# Patient Record
Sex: FEMALE | Race: BLACK OR AFRICAN AMERICAN | HISPANIC OR LATINO | URBAN - METROPOLITAN AREA
[De-identification: names, ages, dates, MRNs, and addresses within clinical notes are randomized per-mention and may not be internally consistent; named-entity substitution may affect disease eponyms.]

---

## 2018-07-17 ENCOUNTER — EMERGENCY (EMERGENCY)
Facility: HOSPITAL | Age: 78
LOS: 1 days | Discharge: HOME | End: 2018-07-17
Attending: EMERGENCY MEDICINE | Admitting: EMERGENCY MEDICINE
Payer: MEDICARE

## 2018-07-17 VITALS
OXYGEN SATURATION: 99 % | TEMPERATURE: 99 F | DIASTOLIC BLOOD PRESSURE: 95 MMHG | RESPIRATION RATE: 16 BRPM | SYSTOLIC BLOOD PRESSURE: 152 MMHG | HEART RATE: 84 BPM

## 2018-07-17 VITALS
DIASTOLIC BLOOD PRESSURE: 94 MMHG | OXYGEN SATURATION: 100 % | TEMPERATURE: 99 F | RESPIRATION RATE: 17 BRPM | HEART RATE: 99 BPM | SYSTOLIC BLOOD PRESSURE: 162 MMHG

## 2018-07-17 LAB
ALBUMIN SERPL ELPH-MCNC: 3.7 G/DL — SIGNIFICANT CHANGE UP (ref 3.3–5)
ALP SERPL-CCNC: 65 U/L — SIGNIFICANT CHANGE UP (ref 40–120)
ALT FLD-CCNC: 14 U/L — SIGNIFICANT CHANGE UP (ref 4–33)
AST SERPL-CCNC: 27 U/L — SIGNIFICANT CHANGE UP (ref 4–32)
BASOPHILS # BLD AUTO: 0.01 K/UL — SIGNIFICANT CHANGE UP (ref 0–0.2)
BASOPHILS NFR BLD AUTO: 0.2 % — SIGNIFICANT CHANGE UP (ref 0–2)
BILIRUB SERPL-MCNC: 0.4 MG/DL — SIGNIFICANT CHANGE UP (ref 0.2–1.2)
BUN SERPL-MCNC: 14 MG/DL — SIGNIFICANT CHANGE UP (ref 7–23)
CALCIUM SERPL-MCNC: 8.9 MG/DL — SIGNIFICANT CHANGE UP (ref 8.4–10.5)
CHLORIDE SERPL-SCNC: 102 MMOL/L — SIGNIFICANT CHANGE UP (ref 98–107)
CO2 SERPL-SCNC: 27 MMOL/L — SIGNIFICANT CHANGE UP (ref 22–31)
CREAT SERPL-MCNC: 0.85 MG/DL — SIGNIFICANT CHANGE UP (ref 0.5–1.3)
EOSINOPHIL # BLD AUTO: 0.13 K/UL — SIGNIFICANT CHANGE UP (ref 0–0.5)
EOSINOPHIL NFR BLD AUTO: 2 % — SIGNIFICANT CHANGE UP (ref 0–6)
GLUCOSE SERPL-MCNC: 78 MG/DL — SIGNIFICANT CHANGE UP (ref 70–99)
HCT VFR BLD CALC: 41.1 % — SIGNIFICANT CHANGE UP (ref 34.5–45)
HGB BLD-MCNC: 13.8 G/DL — SIGNIFICANT CHANGE UP (ref 11.5–15.5)
IMM GRANULOCYTES # BLD AUTO: 0.01 # — SIGNIFICANT CHANGE UP
IMM GRANULOCYTES NFR BLD AUTO: 0.2 % — SIGNIFICANT CHANGE UP (ref 0–1.5)
LYMPHOCYTES # BLD AUTO: 1.59 K/UL — SIGNIFICANT CHANGE UP (ref 1–3.3)
LYMPHOCYTES # BLD AUTO: 24.1 % — SIGNIFICANT CHANGE UP (ref 13–44)
MCHC RBC-ENTMCNC: 32.4 PG — SIGNIFICANT CHANGE UP (ref 27–34)
MCHC RBC-ENTMCNC: 33.6 % — SIGNIFICANT CHANGE UP (ref 32–36)
MCV RBC AUTO: 96.5 FL — SIGNIFICANT CHANGE UP (ref 80–100)
MONOCYTES # BLD AUTO: 0.46 K/UL — SIGNIFICANT CHANGE UP (ref 0–0.9)
MONOCYTES NFR BLD AUTO: 7 % — SIGNIFICANT CHANGE UP (ref 2–14)
NEUTROPHILS # BLD AUTO: 4.41 K/UL — SIGNIFICANT CHANGE UP (ref 1.8–7.4)
NEUTROPHILS NFR BLD AUTO: 66.5 % — SIGNIFICANT CHANGE UP (ref 43–77)
NRBC # FLD: 0 — SIGNIFICANT CHANGE UP
PLATELET # BLD AUTO: 193 K/UL — SIGNIFICANT CHANGE UP (ref 150–400)
PMV BLD: 10.4 FL — SIGNIFICANT CHANGE UP (ref 7–13)
POTASSIUM SERPL-MCNC: 4.7 MMOL/L — SIGNIFICANT CHANGE UP (ref 3.5–5.3)
POTASSIUM SERPL-SCNC: 4.7 MMOL/L — SIGNIFICANT CHANGE UP (ref 3.5–5.3)
PROT SERPL-MCNC: 6.9 G/DL — SIGNIFICANT CHANGE UP (ref 6–8.3)
RBC # BLD: 4.26 M/UL — SIGNIFICANT CHANGE UP (ref 3.8–5.2)
RBC # FLD: 13.2 % — SIGNIFICANT CHANGE UP (ref 10.3–14.5)
SODIUM SERPL-SCNC: 140 MMOL/L — SIGNIFICANT CHANGE UP (ref 135–145)
TROPONIN T, HIGH SENSITIVITY: 7 NG/L — SIGNIFICANT CHANGE UP (ref ?–14)
TROPONIN T, HIGH SENSITIVITY: 7 NG/L — SIGNIFICANT CHANGE UP (ref ?–14)
WBC # BLD: 6.61 K/UL — SIGNIFICANT CHANGE UP (ref 3.8–10.5)
WBC # FLD AUTO: 6.61 K/UL — SIGNIFICANT CHANGE UP (ref 3.8–10.5)

## 2018-07-17 PROCEDURE — 71046 X-RAY EXAM CHEST 2 VIEWS: CPT | Mod: 26

## 2018-07-17 PROCEDURE — 99284 EMERGENCY DEPT VISIT MOD MDM: CPT

## 2018-07-17 RX ORDER — DIPHENHYDRAMINE HCL 50 MG
25 CAPSULE ORAL ONCE
Qty: 0 | Refills: 0 | Status: COMPLETED | OUTPATIENT
Start: 2018-07-17 | End: 2018-07-17

## 2018-07-17 RX ADMIN — Medication 25 MILLIGRAM(S): at 14:19

## 2018-07-17 NOTE — ED PROVIDER NOTE - OBJECTIVE STATEMENT
78y F with PMHx TIA, borderline HTN, and borderline HLD presents to the ED for rash in right upper arm x4 days. States rash has been spreading and face was swelling. Took benadryl with mild relief. Rashes is waxing and waning. Says last month had similar s/x that resolved. No smoke, drink, or drug use. Also states she had CP for couple hours when waking up this morning that self resolved. No vomiting, diarrhea, SOB, difficulty breathing, or sweating. CP rated 5/10 in severity. No hx of heart attacks. Took benadryl at 9:30AM this morning. No new pets, animals, or detergents

## 2018-07-17 NOTE — ED PROVIDER NOTE - MEDICAL DECISION MAKING DETAILS
78y F with hx of TIA, borderline HTN, and borderline HLD coming in for pruritic rash waxing and waning improving today with benadryl. Mild blanching. Rash on right upper arm. Uvula and pharynx within nml limits. Lungs clear to ausculation. Will give benadryl. No new exposures. Also c/o substernal nonradiating CP rated 5/10 in severity this morning while in bed. Lasted for several hours and self resolved. No hx of CP. EKG, labs with troponin, CXR, and aspirin. If troponin negative pt to follow up with PMD as outpt

## 2018-07-17 NOTE — ED ADULT TRIAGE NOTE - CHIEF COMPLAINT QUOTE
Pt complaining of feeling itchy all over her body since Saturday. Pt states she has an intermittent rash all over. Pt states she has been taking Benadryl but has not been feeling better. Pt denies SOB.

## 2018-07-17 NOTE — ED PROVIDER NOTE - NS_ ATTENDINGSCRIBEDETAILS _ED_A_ED_FT
I performed the initial face to face bedside interview with this patient regarding history of present illness, review of symptoms and past medical, social and family history.  I completed an independent physical examination.  I was the initial provider who evaluated this patient.  The history, review of symptoms and examination was documented by the scribe in my presence and I attest to the accuracy of the documentation.  I have signed out the follow up of any pending tests (i.e. labs, radiological studies) to the PA/resident.  I have discussed the patient’s plan of care and disposition with the PA/resident.

## 2018-07-17 NOTE — ED PROVIDER NOTE - PROGRESS NOTE DETAILS
Darwin Haider, PGY3: Pt with no chest pain. Feels well. Repeat trop unchanged. Patient informed of ED visit findings, understands plan.  Patient provided with written and further verbal instructions not included in discharge paperwork.  Patient instructed to follow up with their primary care physician in 2-3 days and return for new, worsened, or persistent symptoms.

## 2023-03-05 NOTE — ED PROVIDER NOTE - SKIN [+], MLM
Hospitalist Progress Note    Chief complaint:  Chief Complaint   Patient presents with    Fall     Pt had a fall pain to right leg. EMS gave Dilaudid and zofran pta        Admit date:  3/1/2023    Days in hospital:    4    Synopsis :  77years old female, presented after a fall inability to ambulate, imaging revealed left intertrochanteric fracture, patient status post ORIF. Patient admitted to hospital medicine service    Assessment and plan:  Principal Problem:    Intertrochanteric fracture of right femur, closed, initial encounter [facility])  Active Problems:    History of femur fracture  Resolved Problems:    * No resolved hospital problems. *       Assessment   left intertrochanteric femoral fracture status post right hip cephalomedullary nail on March 2, Currently on DVT prophylaxis with aspirin 325mg PO BID  Left hip pain  Chronic respiratory failure requiring 4 L nasal cannula  History of COPD, Former smoker   Diarrhea, now resolved. Plan   Continue DVT prophylaxis with aspirin BID per ortho recs   Continue PT OT evaluation in the hospital, will likely need placement to skilled nursing facility  Stool sample not sent for C. difficile rule out, she is not having any more diarrhea. Will hold off on any antidiarrheal treatment at this point  Continue pain management supportive care    DVT prophylaxis: Aspirin  CODE STATUS: Patient is a full code  Discharge plan: Stable to be discharged from clinical standpoint, pending placement to skilled nursing facility    Subjective:   No acute events overnight, this morning patient seen at bedside no acute distress. Patient is not reporting any chest pain. No obvious difficulty in breathing at this point and no worsening of any dyspnea reported. No abdominal pain or discomfort. No dizziness. No new complaint. All questions answered in detail at bedside.   Plan discussed with nursing staff in detail. Objective:    Physical examination:  VS: /76   Pulse 68   Temp 97.5 °F (36.4 °C) (Temporal)   Resp 17   Ht 5' 7\" (1.702 m)   Wt 210 lb 12.8 oz (95.6 kg)   SpO2 98%   BMI 33.02 kg/m²   I/O:   Intake/Output Summary (Last 24 hours) at 3/5/2023 1103  Last data filed at 3/5/2023 0604  Gross per 24 hour   Intake --   Output 1250 ml   Net -1250 ml     General Appearance: alert and oriented to person, place and time, in no acute distress  HEENT: normocephalic and atraumatic, pupils equal, round, and reactive to light, Ssupple and non-tender without mass, no thyromegaly   Cardiovascular: normal rate, regular rhythm, normal S1 and S2, no murmurs, rubs, clicks, or gallops, distal pulses intact, no carotid bruits, no JVD  Pulmonary/Chest: clear to auscultation bilaterally  Abdomen: soft, non-tender, non-distended, normal bowel sounds, no masses   Extremities: Surgical area examined, incisional area covered with surgical dressing, no bloody drainage no discharge noticed, surrounding area having no significant swelling erythema or any signs of infection. Overall looking well than my previous evaluation.  No cyanosis, clubbing or edema, pulse   Musculoskeletal: normal range of motion, no joint swelling, deformity or tenderness  Neurological: alert, oriented, normal speech, no focal findings or movement disorder noted  Skin: warm and dry, no rash or erythema    Medications:  Scheduled Meds:   enoxaparin  40 mg SubCUTAneous Daily    sodium chloride flush  5-40 mL IntraVENous 2 times per day    aspirin  325 mg Oral BID    sodium chloride flush  10 mL IntraVENous 2 times per day    acetaminophen  650 mg Oral 4 times per day    budesonide  250 mcg Nebulization BID    arformoterol tartrate  15 mcg Nebulization BID       PRN Meds:  sodium chloride flush, sodium chloride, albuterol, sodium chloride flush, sodium chloride, ondansetron **OR** ondansetron, magnesium hydroxide, acetaminophen **OR** acetaminophen, morphine, oxyCODONE **OR** oxyCODONE    IV:   sodium chloride      sodium chloride         LABS:  Recent Results (from the past 24 hour(s))   Basic Metabolic Panel w/ Reflex to MG    Collection Time: 03/05/23  4:26 AM   Result Value Ref Range    Sodium 138 132 - 146 mmol/L    Potassium reflex Magnesium 4.4 3.5 - 5.0 mmol/L    Chloride 106 98 - 107 mmol/L    CO2 24 22 - 29 mmol/L    Anion Gap 8 7 - 16 mmol/L    Glucose 124 (H) 74 - 99 mg/dL    BUN 13 6 - 23 mg/dL    Creatinine 0.5 0.5 - 1.0 mg/dL    Est, Glom Filt Rate >60 >=60 mL/min/1.73    Calcium 8.1 (L) 8.6 - 10.2 mg/dL   CBC    Collection Time: 03/05/23  4:26 AM   Result Value Ref Range    WBC 13.1 (H) 4.5 - 11.5 E9/L    RBC 2.92 (L) 3.50 - 5.50 E12/L    Hemoglobin 8.3 (L) 11.5 - 15.5 g/dL    Hematocrit 26.5 (L) 34.0 - 48.0 %    MCV 90.8 80.0 - 99.9 fL    MCH 28.4 26.0 - 35.0 pg    MCHC 31.3 (L) 32.0 - 34.5 %    RDW 14.3 11.5 - 15.0 fL    Platelets 445 894 - 013 E9/L    MPV 10.2 7.0 - 12.0 fL       RADIOLOGY:  XR WRIST RIGHT (MIN 3 VIEWS)    Result Date: 3/1/2023  EXAMINATION: XRAY VIEWS OF THE RIGHT WRIST 3/1/2023 11:22 am COMPARISON: None. HISTORY: ORDERING SYSTEM PROVIDED HISTORY: fall TECHNOLOGIST PROVIDED HISTORY: Reason for exam:->fall What reading provider will be dictating this exam?->CRC FINDINGS: Carpal bones and alignment are maintained. Distal radius and ulna are intact. No acute fracture or dislocation. Normal wrist radiographs     XR HAND RIGHT (MIN 3 VIEWS)    Result Date: 3/1/2023  EXAMINATION: THREE XRAY VIEWS OF THE RIGHT HAND 3/1/2023 11:22 am COMPARISON: None. HISTORY: ORDERING SYSTEM PROVIDED HISTORY: fall TECHNOLOGIST PROVIDED HISTORY: Reason for exam:->fall What reading provider will be dictating this exam?->CRC FINDINGS: There is no evidence of acute fracture. There is normal alignment. No acute joint abnormality. No focal osseous lesion. No focal soft tissue abnormality. No acute osseous abnormality.      XR FEMUR RIGHT (MIN 2 VIEWS)    Result Date: 3/2/2023  EXAMINATION: XRAY VIEWS OF THE RIGHT FEMUR; ONE XRAY VIEW OF THE PELVIS AND TWO XRAY VIEWS RIGHT HIP; SPOT FLUOROSCOPIC IMAGES 3/2/2023 4:03 pm COMPARISON: None. HISTORY: ORDERING SYSTEM PROVIDED HISTORY: Post op in PACU TECHNOLOGIST PROVIDED HISTORY: Reason for exam:->Post op in PACU What reading provider will be dictating this exam?->CRC FINDINGS: Pelvis and right hip. Patient is status post intertrochanteric fracture of the right femur with internal fixation with normal alignment. Degenerative  changes are identified in the lumbosacral junction and left hip. Dilated bowel loops are noted. . Right femur. Intertrochanteric fracture of the right femur with internal fixation with screws and intramedullary tristian are identified with normal alignment. Postoperative changes are identified in the right groin and right hip. There is advanced degenerative changes in the right knee. intertrochanteric fracture of the right femur with internal fixation with the normal alignment. There is associated postoperative changes. Degenerative changes in the right knee. Fluoroscopy for surgery. Fluoroscopy is was provided for internal fixation of the right hip fracture. Total fluoroscopy time is 92.5 seconds and accumulated dose is 23.35 mGy. 8 fluoroscopic images are provided. The images demonstrate internal fixation of the right femoral fracture. Impression Fluoroscopy assistance are provided for internal fixation of the right femoral fracture. XR FEMUR RIGHT (MIN 2 VIEWS)    Result Date: 3/1/2023  EXAMINATION: XRAY VIEWS OF THE RIGHT FEMUR 3/1/2023 11:22 am COMPARISON: None. HISTORY: ORDERING SYSTEM PROVIDED HISTORY: fall TECHNOLOGIST PROVIDED HISTORY: Reason for exam:->fall What reading provider will be dictating this exam?->CRC FINDINGS: There is comminuted intertrochanteric fracture and varus angulation. Degenerative changes in the visualized knee.   Soft tissue swelling adjacent to the fracture site noted. Comminuted intertrochanteric fracture. CT HEAD WO CONTRAST    Result Date: 3/1/2023  EXAMINATION: CT OF THE HEAD WITHOUT CONTRAST  3/1/2023 11:32 am TECHNIQUE: CT of the head was performed without the administration of intravenous contrast. Automated exposure control, iterative reconstruction, and/or weight based adjustment of the mA/kV was utilized to reduce the radiation dose to as low as reasonably achievable. COMPARISON: None. HISTORY: ORDERING SYSTEM PROVIDED HISTORY: fall TECHNOLOGIST PROVIDED HISTORY: Reason for exam:->fall Has a \"code stroke\" or \"stroke alert\" been called? ->No Decision Support Exception - unselect if not a suspected or confirmed emergency medical condition->Emergency Medical Condition (MA) What reading provider will be dictating this exam?->CRC FINDINGS: BRAIN/VENTRICLES: There is no acute intracranial hemorrhage, mass effect or midline shift. No abnormal extra-axial fluid collection. The gray-white differentiation is maintained without evidence of an acute infarct. There is no evidence of hydrocephalus. The ventricles, cisterns and sulci are prominent consistent with atrophy. There is decreased attenuation within the periventricular white matter consistent with periventricular leukomalacia. ORBITS: The visualized portion of the orbits demonstrate no acute abnormality. SINUSES: The visualized paranasal sinuses and mastoid air cells demonstrate no acute abnormality. SOFT TISSUES/SKULL:  No acute abnormality of the visualized skull or soft tissues. 1.  There is no acute intracranial abnormality. Specifically, there is no intracranial hemorrhage. 2. Atrophy and periventricular leukomalacia, 3. 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  .     CT CERVICAL SPINE WO CONTRAST    Result Date: 3/1/2023  EXAMINATION: CT OF THE CERVICAL SPINE WITHOUT CONTRAST 3/1/2023 11:32 am TECHNIQUE: CT of the cervical spine was performed without the administration of intravenous contrast. Multiplanar reformatted images are provided for review. Automated exposure control, iterative reconstruction, and/or weight based adjustment of the mA/kV was utilized to reduce the radiation dose to as low as reasonably achievable. COMPARISON: None. HISTORY: ORDERING SYSTEM PROVIDED HISTORY: fall TECHNOLOGIST PROVIDED HISTORY: Reason for exam:->fall Decision Support Exception - unselect if not a suspected or confirmed emergency medical condition->Emergency Medical Condition (MA) What reading provider will be dictating this exam?->CRC FINDINGS: The ring of C1 is intact as is the dense. There is no compression fracture of the cervical spine. No jumped or perched facet is noted. Multilevel degenerative disc and degenerative joint disease is noted. The prevertebral soft tissues are unremarkable. The airway is widely patent. Images through the lung apices are negative for a pneumothorax. 1. There is no acute compression fracture or subluxation of the cervical spine. 2. Multilevel degenerative disc and degenerative joint disease. XR CHEST PORTABLE    Result Date: 3/1/2023  EXAMINATION: ONE XRAY VIEW OF THE CHEST 3/1/2023 11:22 am COMPARISON: None. HISTORY: ORDERING SYSTEM PROVIDED HISTORY: fall TECHNOLOGIST PROVIDED HISTORY: Reason for exam:->fall What reading provider will be dictating this exam?->CRC FINDINGS: Mild atelectasis left lung base. The heart is mildly enlarged. There is no pneumothorax. No large pleural effusion. No acute cardiopulmonary process. Question minimal atelectasis left lung base. FLUORO FOR SURGICAL PROCEDURES    Result Date: 3/2/2023  EXAMINATION: XRAY VIEWS OF THE RIGHT FEMUR; ONE XRAY VIEW OF THE PELVIS AND TWO XRAY VIEWS RIGHT HIP; SPOT FLUOROSCOPIC IMAGES 3/2/2023 4:03 pm COMPARISON: None.  HISTORY: ORDERING SYSTEM PROVIDED HISTORY: Post op in PACU TECHNOLOGIST PROVIDED HISTORY: Reason for exam:->Post op in PACU What reading provider will be dictating this exam?->CRC FINDINGS: Pelvis and right hip. Patient is status post intertrochanteric fracture of the right femur with internal fixation with normal alignment. Degenerative  changes are identified in the lumbosacral junction and left hip. Dilated bowel loops are noted. . Right femur. Intertrochanteric fracture of the right femur with internal fixation with screws and intramedullary tristian are identified with normal alignment. Postoperative changes are identified in the right groin and right hip. There is advanced degenerative changes in the right knee. intertrochanteric fracture of the right femur with internal fixation with the normal alignment. There is associated postoperative changes. Degenerative changes in the right knee. Fluoroscopy for surgery. Fluoroscopy is was provided for internal fixation of the right hip fracture. Total fluoroscopy time is 92.5 seconds and accumulated dose is 23.35 mGy. 8 fluoroscopic images are provided. The images demonstrate internal fixation of the right femoral fracture. Impression Fluoroscopy assistance are provided for internal fixation of the right femoral fracture. XR HIP 2-3 VW W PELVIS RIGHT    Result Date: 3/2/2023  EXAMINATION: XRAY VIEWS OF THE RIGHT FEMUR; ONE XRAY VIEW OF THE PELVIS AND TWO XRAY VIEWS RIGHT HIP; SPOT FLUOROSCOPIC IMAGES 3/2/2023 4:03 pm COMPARISON: None. HISTORY: ORDERING SYSTEM PROVIDED HISTORY: Post op in PACU TECHNOLOGIST PROVIDED HISTORY: Reason for exam:->Post op in PACU What reading provider will be dictating this exam?->CRC FINDINGS: Pelvis and right hip. Patient is status post intertrochanteric fracture of the right femur with internal fixation with normal alignment. Degenerative  changes are identified in the lumbosacral junction and left hip. Dilated bowel loops are noted. . Right femur.  Intertrochanteric fracture of the right femur with internal fixation with screws and intramedullary tristian are identified with normal alignment. Postoperative changes are identified in the right groin and right hip. There is advanced degenerative changes in the right knee. intertrochanteric fracture of the right femur with internal fixation with the normal alignment. There is associated postoperative changes. Degenerative changes in the right knee. Fluoroscopy for surgery. Fluoroscopy is was provided for internal fixation of the right hip fracture. Total fluoroscopy time is 92.5 seconds and accumulated dose is 23.35 mGy. 8 fluoroscopic images are provided. The images demonstrate internal fixation of the right femoral fracture. Impression Fluoroscopy assistance are provided for internal fixation of the right femoral fracture. XR HIP 2-3 VW W PELVIS RIGHT    Result Date: 3/1/2023  EXAMINATION: ONE XRAY VIEW OF THE PELVIS AND TWO XRAY VIEWS RIGHT HIP 3/1/2023 11:22 am HISTORY: ORDERING SYSTEM PROVIDED HISTORY: fall TECHNOLOGIST PROVIDED HISTORY: Reason for exam:->fall What reading provider will be dictating this exam?->CRC FINDINGS: There is mildly angulated right intertrochanteric fracture and adjacent soft tissue swelling. Intertrochanteric fracture. Electronically signed by Shirley Vargas MD on 3/5/2023 at 11:03 AM  NOTE: This report was transcribed using voice recognition software. Every effort was made to ensure accuracy; however, inadvertent computerized transcription errors may be present. RASH/swelling